# Patient Record
(demographics unavailable — no encounter records)

---

## 2024-11-12 NOTE — ASSESSMENT
[FreeTextEntry1] : 68F presenting with DNS and R odontogenic sinusitis s/p R STESS (no sphenoid) 11/6/24

## 2024-11-12 NOTE — PHYSICAL EXAM
[Nasal Endoscopy Performed] : nasal endoscopy was performed, see procedure section for findings [Normal] : mucosa is normal [Midline] : trachea located in midline position [de-identified] : periorbital ecchymosis

## 2024-11-12 NOTE — PHYSICAL EXAM
[Nasal Endoscopy Performed] : nasal endoscopy was performed, see procedure section for findings [Normal] : mucosa is normal [Midline] : trachea located in midline position [de-identified] : periorbital ecchymosis

## 2024-11-12 NOTE — PROCEDURE
[FreeTextEntry6] : Nasal Endoscopy: Bilateral nasal cavity debridement (CPT 79003)   Indication: post op R STESS   Procedure: There was expected post operative inflammation in both the right and left nasal cavity. Thick mucoid secretions and blood clot were suctioned. Doyles' removed.   Left: The septum is midline The inferior turbinate was well reduced/outfractured MT in neutral position MM clear SER clear    Right: The septum is midline The inferior turbinate was well reduced/outfractured The MT is medialized MM with packing, suctioned  The max with thick post obstructive mucus, suctioned Packing material suctioned from ethmoid cavity and post obstructive mucus The frontal outflow with edema

## 2024-11-12 NOTE — REASON FOR VISIT
[Subsequent Evaluation] : a subsequent evaluation for [FreeTextEntry2] : referred by Dr. Prather for CRS

## 2024-11-12 NOTE — HISTORY OF PRESENT ILLNESS
[de-identified] : Update 11/12/24: s/p R STESS (no sphenoid) 11/6/24, pathology pending. Rinsing with saline irrigations BID. Still taking antibiotics. Took only 1 tylenol, no pain. +congestion, loss of smell.   68F presenting with R chronic maxillary sinusitis, concerning for odontogenic sinusitis. Patient reports she had multiple rounds of steroids/antibiotics which gives her temporary relief but symptoms always comes back. She had dental disease on R and had multiple teeth removed over the past 2 years. She also reports thick pus dripping from nose. She has tried nasal sprays without relief.  CT reviewed showing Complete opacification of the right maxillary sinus with soft tissue extending into the nasal cavity. Moderate right ethmoid and mild right frontal sinus disease with occluded right frontoethmoidal junction

## 2024-11-12 NOTE — HISTORY OF PRESENT ILLNESS
[de-identified] : Update 11/12/24: s/p R STESS (no sphenoid) 11/6/24, pathology pending. Rinsing with saline irrigations BID. Still taking antibiotics. Took only 1 tylenol, no pain. +congestion, loss of smell.   68F presenting with R chronic maxillary sinusitis, concerning for odontogenic sinusitis. Patient reports she had multiple rounds of steroids/antibiotics which gives her temporary relief but symptoms always comes back. She had dental disease on R and had multiple teeth removed over the past 2 years. She also reports thick pus dripping from nose. She has tried nasal sprays without relief.  CT reviewed showing Complete opacification of the right maxillary sinus with soft tissue extending into the nasal cavity. Moderate right ethmoid and mild right frontal sinus disease with occluded right frontoethmoidal junction

## 2024-11-12 NOTE — PROCEDURE
[FreeTextEntry6] : Nasal Endoscopy: Bilateral nasal cavity debridement (CPT 86172)   Indication: post op R STESS   Procedure: There was expected post operative inflammation in both the right and left nasal cavity. Thick mucoid secretions and blood clot were suctioned. Doyles' removed.   Left: The septum is midline The inferior turbinate was well reduced/outfractured MT in neutral position MM clear SER clear    Right: The septum is midline The inferior turbinate was well reduced/outfractured The MT is medialized MM with packing, suctioned  The max with thick post obstructive mucus, suctioned Packing material suctioned from ethmoid cavity and post obstructive mucus The frontal outflow with edema I have personally seen and examined this patient.  I have fully participated in the care of this patient. I have reviewed all pertinent clinical information, including history, physical exam, plan and the Resident’s note and agree except as noted.

## 2024-11-29 NOTE — PHYSICAL EXAM
[Nasal Endoscopy Performed] : nasal endoscopy was performed, see procedure section for findings [Normal] : mucosa is normal [Midline] : trachea located in midline position [de-identified] : periorbital ecchymosis

## 2024-11-29 NOTE — HISTORY OF PRESENT ILLNESS
[de-identified] : Update 12/3/24:  s/p R STESS (no sphenoid) 11/6/24. Using saline irrigation BID 1.  Paranasal sinus, right sinus contents, endoscopic sinus surgery - Fungal ball (consistent with Aspergillus) - Chronic sinusitis and inflammatory polyp 2.  Nasal cavity, septal bone and cartilage, septoplasty - Cartilage and bone  Update 11/12/24: s/p R STESS (no sphenoid) 11/6/24, pathology pending. Rinsing with saline irrigations BID. Still taking antibiotics. Took only 1 tylenol, no pain. +congestion, loss of smell.   68F presenting with R chronic maxillary sinusitis, concerning for odontogenic sinusitis. Patient reports she had multiple rounds of steroids/antibiotics which gives her temporary relief but symptoms always comes back. She had dental disease on R and had multiple teeth removed over the past 2 years. She also reports thick pus dripping from nose. She has tried nasal sprays without relief.  CT reviewed showing Complete opacification of the right maxillary sinus with soft tissue extending into the nasal cavity. Moderate right ethmoid and mild right frontal sinus disease with occluded right frontoethmoidal junction

## 2024-11-29 NOTE — ASSESSMENT
[FreeTextEntry1] : 68F presenting with DNS and R odontogenic sinusitis s/p R STESS (no sphenoid) 11/6/24, doing well.

## 2024-11-29 NOTE — PROCEDURE
[FreeTextEntry6] : Nasal Endoscopy: Bilateral nasal cavity debridement (CPT 87190)   Indication: post op R STESS   Procedure: There was expected post operative inflammation in both the right and left nasal cavity. Thick mucoid secretions and blood clot were suctioned. Doyles' removed.   Left: The septum is midline The inferior turbinate was well reduced/outfractured MT in neutral position MM clear SER clear    Right: The septum is midline The inferior turbinate was well reduced/outfractured The MT is medialized MM with packing, suctioned  The max with thick post obstructive mucus, suctioned Packing material suctioned from ethmoid cavity and post obstructive mucus The frontal outflow with edema

## 2024-12-10 NOTE — PROCEDURE
[FreeTextEntry6] : Nasal Endoscopy   Indication: post op R STESS   Left: The septum is midline The inferior turbinate was well reduced/outfractured MT in neutral position MM clear SER clear    Right: The septum is midline The inferior turbinate was well reduced/outfractured The MT is medialized MM clear The max clear ethmoid clear, orbit remucosalized  The frontal outflow with edema and a small opening

## 2025-02-10 NOTE — PROCEDURE
[Anterior rhinoscopy insufficient to account for symptoms] : anterior rhinoscopy insufficient to account for symptoms [None] : none [Rigid Endoscope] : examined with a rigid endoscope [de-identified] :  Xavier Humphries [de-identified] : Nasal Endoscopy   Indication: post op R STESS   Left: The septum is midline The inferior turbinate was well reduced/outfractured MT in neutral position MM clear SER clear    Right: The septum is midline The inferior turbinate was well reduced/outfractured The MT is medialized MM clear The max clear ethmoid clear, orbit remucosalized  The frontal outflow with edema and a small opening

## 2025-02-10 NOTE — PHYSICAL EXAM
[Nasal Endoscopy Performed] : nasal endoscopy was performed, see procedure section for findings [Normal] : mucosa is normal [Midline] : trachea located in midline position [de-identified] : periorbital ecchymosis

## 2025-02-10 NOTE — PROCEDURE
[Anterior rhinoscopy insufficient to account for symptoms] : anterior rhinoscopy insufficient to account for symptoms [None] : none [Rigid Endoscope] : examined with a rigid endoscope [de-identified] :  Xavier Humphries [de-identified] : Nasal Endoscopy   Indication: post op R STESS   Left: The septum is midline The inferior turbinate was well reduced/outfractured MT in neutral position MM clear SER clear    Right: The septum is midline The inferior turbinate was well reduced/outfractured The MT is medialized MM clear The max clear ethmoid clear, orbit remucosalized  The frontal outflow with edema and a small opening

## 2025-02-10 NOTE — PHYSICAL EXAM
[Nasal Endoscopy Performed] : nasal endoscopy was performed, see procedure section for findings [Normal] : mucosa is normal [Midline] : trachea located in midline position [de-identified] : periorbital ecchymosis

## 2025-02-10 NOTE — HISTORY OF PRESENT ILLNESS
[de-identified] : Update 2/11/25: s/p R STESS (no sphenoid) 11/6/24. Using saline irrigations PRN.   Update 12/10/24:  s/p R STESS (no sphenoid) 11/6/24. Using peroxide spray, not currently irrigating. Feeling very good, breathign well, no purulent PND. 1.  Paranasal sinus, right sinus contents, endoscopic sinus surgery - Fungal ball (consistent with Aspergillus) - Chronic sinusitis and inflammatory polyp 2.  Nasal cavity, septal bone and cartilage, septoplasty - Cartilage and bone  Update 11/12/24: s/p R STESS (no sphenoid) 11/6/24, pathology pending. Rinsing with saline irrigations BID. Still taking antibiotics. Took only 1 tylenol, no pain. +congestion, loss of smell.   68F presenting with R chronic maxillary sinusitis, concerning for odontogenic sinusitis. Patient reports she had multiple rounds of steroids/antibiotics which gives her temporary relief but symptoms always comes back. She had dental disease on R and had multiple teeth removed over the past 2 years. She also reports thick pus dripping from nose. She has tried nasal sprays without relief.  CT reviewed showing Complete opacification of the right maxillary sinus with soft tissue extending into the nasal cavity. Moderate right ethmoid and mild right frontal sinus disease with occluded right frontoethmoidal junction

## 2025-02-10 NOTE — HISTORY OF PRESENT ILLNESS
[de-identified] : Update 2/11/25: s/p R STESS (no sphenoid) 11/6/24. Using saline irrigations PRN.   Update 12/10/24:  s/p R STESS (no sphenoid) 11/6/24. Using peroxide spray, not currently irrigating. Feeling very good, breathign well, no purulent PND. 1.  Paranasal sinus, right sinus contents, endoscopic sinus surgery - Fungal ball (consistent with Aspergillus) - Chronic sinusitis and inflammatory polyp 2.  Nasal cavity, septal bone and cartilage, septoplasty - Cartilage and bone  Update 11/12/24: s/p R STESS (no sphenoid) 11/6/24, pathology pending. Rinsing with saline irrigations BID. Still taking antibiotics. Took only 1 tylenol, no pain. +congestion, loss of smell.   68F presenting with R chronic maxillary sinusitis, concerning for odontogenic sinusitis. Patient reports she had multiple rounds of steroids/antibiotics which gives her temporary relief but symptoms always comes back. She had dental disease on R and had multiple teeth removed over the past 2 years. She also reports thick pus dripping from nose. She has tried nasal sprays without relief.  CT reviewed showing Complete opacification of the right maxillary sinus with soft tissue extending into the nasal cavity. Moderate right ethmoid and mild right frontal sinus disease with occluded right frontoethmoidal junction